# Patient Record
Sex: MALE | Race: WHITE | NOT HISPANIC OR LATINO | Employment: FULL TIME | URBAN - METROPOLITAN AREA
[De-identification: names, ages, dates, MRNs, and addresses within clinical notes are randomized per-mention and may not be internally consistent; named-entity substitution may affect disease eponyms.]

---

## 2017-04-05 ENCOUNTER — OFFICE VISIT (OUTPATIENT)
Dept: MEDICAL GROUP | Age: 42
End: 2017-04-05
Payer: COMMERCIAL

## 2017-04-05 VITALS
TEMPERATURE: 98.6 F | DIASTOLIC BLOOD PRESSURE: 84 MMHG | SYSTOLIC BLOOD PRESSURE: 142 MMHG | BODY MASS INDEX: 34.07 KG/M2 | HEART RATE: 86 BPM | OXYGEN SATURATION: 95 % | HEIGHT: 69 IN | WEIGHT: 230 LBS

## 2017-04-05 DIAGNOSIS — E66.9 OBESITY (BMI 30-39.9): ICD-10-CM

## 2017-04-05 DIAGNOSIS — Z00.00 PREVENTATIVE HEALTH CARE: ICD-10-CM

## 2017-04-05 DIAGNOSIS — R73.01 IFG (IMPAIRED FASTING GLUCOSE): ICD-10-CM

## 2017-04-05 DIAGNOSIS — I10 ESSENTIAL HYPERTENSION: ICD-10-CM

## 2017-04-05 DIAGNOSIS — K21.9 GASTROESOPHAGEAL REFLUX DISEASE WITHOUT ESOPHAGITIS: ICD-10-CM

## 2017-04-05 PROCEDURE — 99214 OFFICE O/P EST MOD 30 MIN: CPT | Performed by: FAMILY MEDICINE

## 2017-04-05 RX ORDER — OMEPRAZOLE 40 MG/1
40 CAPSULE, DELAYED RELEASE ORAL DAILY
Qty: 90 CAP | Refills: 2 | Status: SHIPPED | OUTPATIENT
Start: 2017-04-05 | End: 2018-03-13

## 2017-04-05 ASSESSMENT — PATIENT HEALTH QUESTIONNAIRE - PHQ9: CLINICAL INTERPRETATION OF PHQ2 SCORE: 0

## 2017-04-05 NOTE — ASSESSMENT & PLAN NOTE
Patient states that on his last lab report he was not fasting. He denied any polyuria, no polydipsia, no polyphagia, no weight loss, no numbness or tingling anywhere, no change in vision.      Results for HOLLEY ESTRADA II (MRN [id number]) as of 4/5/2017 15:28   Ref. Range 5/14/2015 09:34   Glucose Latest Ref Range: 65-99 mg/dL 111 (H)

## 2017-04-05 NOTE — ASSESSMENT & PLAN NOTE
The patient is a 41-year-old male  who presents to clinic to have clearance for continuing his work. He states that he is able to run several miles without any chest pain, never had a seizure he takes his amlodipine for high blood pressure and is tolerating just fine. He denies any family history of cardiovascular disease in fact both parents are alive at the age of 80 and 84.

## 2017-04-05 NOTE — PROGRESS NOTES
This medical record contains text that has been entered with the assistance of computer voice recognition and dictation software.  Therefore, it may contain unintended errors in text, spelling, punctuation, or grammar    No chief complaint on file.      Dung Estrada II is a 41 y.o. male here evaluation and management of: Medical clearance for [place], reflux      HPI:     Gastroesophageal reflux disease without esophagitis  Patient states that over the last few months he's been having on and off reflux symptoms. Severe burning in the stomach usually worsened by fatty foods or acidic food. He states that he woke up at night once because of the reflux. Patient denies any difficulty swallowing, no regurgitation, no eructation, no weight loss, no nocturnal asthma no food getting stuck in the chest.      IFG (impaired fasting glucose)  Patient states that on his last lab report he was not fasting. He denied any polyuria, no polydipsia, no polyphagia, no weight loss, no numbness or tingling anywhere, no change in vision.      Results for DUNG ESTRADA II (MRN [id number]) as of 4/5/2017 15:28   Ref. Range 5/14/2015 09:34   Glucose Latest Ref Range: 65-99 mg/dL 111 (H)       [facility] health care  The patient is a 41-year-old male  who presents to clinic to have clearance for continuing his work. He states that he is able to run several miles without any chest pain, never had a seizure he takes his amlodipine for high blood pressure and is tolerating just fine. He denies any family history of cardiovascular disease in fact both parents are alive at the age of 80 and 84.      Current medicines (including changes today)  Current Outpatient Prescriptions   Medication Sig Dispense Refill   • omeprazole (PRILOSEC) 40 MG delayed-release capsule Take 1 Cap by mouth every day. 90 Cap 2   • amlodipine (NORVASC) 10 MG Tab Take 1 Tab by mouth every day. 90 Tab 1   • valacyclovir (VALTREX) 500 MG Tab  "Take 1 Tab by mouth 2 times a day. 2 times a day for 3 days 30 Tab 3   • fluticasone (FLONASE) 50 MCG/ACT nasal spray Spray 2 Sprays in nose every day. Each Nostril (Patient not taking: Reported on 4/5/2017) 16 g 3   • fluticasone (FLONASE) 50 MCG/ACT nasal spray Spray 1 Spray in nose 2 times a day. (Patient not taking: Reported on 4/5/2017) 1 Bottle 0   • mbx (MAALOX PLUS-BENADRYL-XYLOCAINE) Take 5 mL by mouth every 6 hours as needed. (Patient not taking: Reported on 8/12/2015) 90 mL 0     No current facility-administered medications for this visit.     He  has a past medical history of HTN (hypertension) (7/14/2009); Recurrent cold sores (7/14/2009); and Rash, skin.  He  has past surgical history that includes shoulder arthroscopy (2010 and 3/13).  Social History   Substance Use Topics   • Smoking status: Never Smoker    • Smokeless tobacco: Never Used   • Alcohol Use: 0.0 oz/week     0 Standard drinks or equivalent per week      Comment: special occasions     Social History     Social History Narrative     Family History   Problem Relation Age of Onset   • Hypertension Mother    • Hypertension Father      Family Status   Relation Status Death Age   • Mother Alive    • Father Alive    • Sister Alive    • Brother Alive    • Sister Alive    • Sister Alive    • Sister Alive    • Brother Alive          ROS  Please see hpi    All other systems reviewed and are negative     Objective:     Blood pressure 142/84, pulse 86, temperature 37 °C (98.6 °F), height 1.753 m (5' 9.02\"), weight 104.327 kg (230 lb), SpO2 95 %. Body mass index is 33.95 kg/(m^2).  Physical Exam:    Constitutional: Alert, no distress.  Skin: Warm, dry, good turgor, no rashes in visible areas.  Eye: Equal, round and reactive, conjunctiva clear, lids normal.  ENMT: Lips without lesions, good dentition, oropharynx clear.  Neck: Trachea midline, no masses, no thyromegaly. No cervical or supraclavicular lymphadenopathy.  Respiratory: Unlabored respiratory " effort, lungs clear to auscultation, no wheezes, no ronchi.  Cardiovascular: Normal S1, S2, no murmur, no edema.  Abdomen: Soft, non-tender, no masses, no hepatosplenomegaly.  Psych: Alert and oriented x3, normal affect and mood.          Assessment and Plan:   The following treatment plan was discussed, again this medical record contains text that has been entered with the assistance of computer voice recognition and dictation software.  Therefore, it may contain unintended errors in text, spelling, punctuation, or grammar      1. Essential hypertension  Patient has been stable with current management  We will make no changes for now    2. Preventative health care  Due for repeat labs  He is cleared to continue working as a   Based on exam today.    - COMP METABOLIC PANEL; Future  - CBC WITH DIFFERENTIAL; Future  - LIPID PROFILE; Future    3. IFG (impaired fasting glucose)    - HEMOGLOBIN A1C; Future    4. Gastroesophageal reflux disease without esophagitis     Gerd precautions given (avoid the supine position after eating, avoid tight fitting clothing, head of bed elevation by raisin legs of bed,  avoid eating prior to sleeping, avoid reflux-inducing foods (foods high in fat, chocolate, peppermint, and excessive alcohol, which can decrease LES pressure), promote salivation by chewing gum or lozenges,  - omeprazole (PRILOSEC) 40 MG delayed-release capsule; Take 1 Cap by mouth every day.  Dispense: 90 Cap; Refill: 2        Followup: Return in about 6 months (around 10/5/2017) for Reevaluation.

## 2017-04-05 NOTE — MR AVS SNAPSHOT
"        Dung Garveynatty II   2017 3:40 PM   Office Visit   MRN: [id number]    Department:  [address] [place] [address]   Dept Phone:  [phone]    Description:  Male : 1975   Provider:  Cyn Iqbal M.D.           Allergies as of 2017     No Known Allergies      You were diagnosed with     Essential hypertension   [7295395]       Preventative health care   [403466]       IFG (impaired fasting glucose)   [768633]       Gastroesophageal reflux disease without esophagitis   [286068]       Obesity (BMI 30-39.9)   [683755]         Vital Signs     Blood Pressure Pulse Temperature Height Weight Body Mass Index    142/84 mmHg 86 37 °C (98.6 °F) 1.753 m (5' 9.02\") 104.327 kg (230 lb) 33.95 kg/m2    Oxygen Saturation Smoking Status                95% Never Smoker           Basic Information     Date Of Birth Sex Race Ethnicity Preferred Language    1975 Male White Non- English      Problem List              ICD-10-CM Priority Class Noted - Resolved    Herpes simplex type 1 infection B00.9   2009 - Present    HTN (hypertension) I10   2010 - Present    Preventative health care Z00.00   2017 - Present    IFG (impaired fasting glucose) R73.01   2017 - Present    Gastroesophageal reflux disease without esophagitis K21.9   2017 - Present    Obesity (BMI 30-39.9) E66.9   2017 - Present      Health Maintenance        Date Due Completion Dates    IMM DTaP/Tdap/Td Vaccine (2 - Td) 10/26/2019 10/26/2009, 1998            Current Immunizations     Influenza Vaccine Quad Inj (Preserved) 12/3/2014    TD Vaccine 1998    Tdap Vaccine 10/26/2009  5:45 PM      Below and/or attached are the medications your provider expects you to take. Review all of your home medications and newly ordered medications with your provider and/or pharmacist. Follow medication instructions as directed by your provider and/or pharmacist. Please keep your medication list with you and share with " your provider. Update the information when medications are discontinued, doses are changed, or new medications (including over-the-counter products) are added; and carry medication information at all times in the event of emergency situations     Allergies:  No Known Allergies          Medications  Valid as of: April 05, 2017 -  4:09 PM    Generic Name Brand Name Tablet Size Instructions for use    AmLODIPine Besylate (Tab) NORVASC 10 MG Take 1 Tab by mouth every day.        Fluticasone Propionate (Suspension) FLONASE 50 MCG/ACT Spray 1 Spray in nose 2 times a day.        Fluticasone Propionate (Suspension) FLONASE 50 MCG/ACT Spray 2 Sprays in nose every day. Each Nostril        mbx (MAALOX PLUS-BENADRYL-XYLOCAINE) (Suspension) MBX  Take 5 mL by mouth every 6 hours as needed.        Omeprazole (CAPSULE DELAYED RELEASE) PRILOSEC 40 MG Take 1 Cap by mouth every day.        ValACYclovir HCl (Tab) VALTREX 500 MG Take 1 Tab by mouth 2 times a day. 2 times a day for 3 days        .                 Medicines prescribed today were sent to:     [facility] [address] [place] [address] [address] [facility] [address]  AT [place] [address] & [place]    [address] [facility] [address] [facility] [address]    Phone: [phone] Fax: [phone]    Open 24 Hours?: No      Medication refill instructions:       If your prescription bottle indicates you have medication refills left, it is not necessary to call your provider’s office. Please contact your pharmacy and they will refill your medication.    If your prescription bottle indicates you do not have any refills left, you may request refills at any time through one of the following ways: The online [employer] system (except Urgent Care), by calling your provider’s office, or by asking your pharmacy to contact your provider’s office with a refill request. Medication refills are processed only during regular business hours and may not be available until the next business day. Your provider may  request additional information or to have a follow-up visit with you prior to refilling your medication.   *Please Note: Medication refills are assigned a new Rx number when refilled electronically. Your pharmacy may indicate that no refills were authorized even though a new prescription for the same medication is available at the pharmacy. Please request the medicine by name with the pharmacy before contacting your provider for a refill.        Your To Do List     Future Labs/Procedures Complete By Expires    CBC WITH DIFFERENTIAL  As directed 4/5/2018    COMP METABOLIC PANEL  As directed 4/5/2018    HEMOGLOBIN A1C  As directed 4/5/2018    LIPID PROFILE  As directed 4/5/2018         [place] Access Code: Activation code not generated  Current [place] Status: Active

## 2017-04-05 NOTE — ASSESSMENT & PLAN NOTE
Patient states that over the last few months he's been having on and off reflux symptoms. Severe burning in the stomach usually worsened by fatty foods or acidic food. He states that he woke up at night once because of the reflux. Patient denies any difficulty swallowing, no regurgitation, no eructation, no weight loss, no nocturnal asthma no food getting stuck in the chest.

## 2017-04-07 RX ORDER — AMLODIPINE BESYLATE 10 MG/1
TABLET ORAL
Qty: 90 TAB | Refills: 0 | Status: SHIPPED | OUTPATIENT
Start: 2017-04-07 | End: 2017-04-25 | Stop reason: SDUPTHER

## 2017-04-24 ENCOUNTER — PATIENT MESSAGE (OUTPATIENT)
Dept: MEDICAL GROUP | Age: 42
End: 2017-04-24

## 2017-04-24 DIAGNOSIS — B00.9 HERPES SIMPLEX TYPE 1 INFECTION: ICD-10-CM

## 2017-04-24 DIAGNOSIS — I10 ESSENTIAL HYPERTENSION: ICD-10-CM

## 2017-04-24 RX ORDER — AMLODIPINE BESYLATE 10 MG/1
10 TABLET ORAL
Qty: 90 TAB | Refills: 0 | Status: CANCELLED | OUTPATIENT
Start: 2017-04-24

## 2017-04-24 NOTE — TELEPHONE ENCOUNTER
Was the patient seen in the last year in this department? Yes     Does patient have an active prescription for medications requested? Yes     Received Request Via: Patient

## 2017-04-25 RX ORDER — VALACYCLOVIR HYDROCHLORIDE 500 MG/1
500 TABLET, FILM COATED ORAL 2 TIMES DAILY
Qty: 30 TAB | Refills: 0 | Status: SHIPPED | OUTPATIENT
Start: 2017-04-25 | End: 2018-03-13 | Stop reason: SDUPTHER

## 2017-04-25 RX ORDER — AMLODIPINE BESYLATE 10 MG/1
10 TABLET ORAL
Qty: 90 TAB | Refills: 1 | Status: SHIPPED | OUTPATIENT
Start: 2017-04-25 | End: 2018-03-13 | Stop reason: SDUPTHER

## 2017-04-25 NOTE — TELEPHONE ENCOUNTER
Medication resent for 90 to 1 refill.   He was last seen by me in 2015- thus would recommend schedule for fv as previously recommended by Dr. Iqbal for 6 months from his last visit. He does need blood work completed and monitoring of his BP.   Will fill Rx for acyclovir.   Please help schedule appointment as above.

## 2017-04-25 NOTE — TELEPHONE ENCOUNTER
Phone Number Called: [phone] (home)    Message: informed patient of message below.  Patient will call back to schedule appt.    Left Message for patient to call back: no

## 2018-03-13 ENCOUNTER — OFFICE VISIT (OUTPATIENT)
Dept: MEDICAL GROUP | Facility: PHYSICIAN GROUP | Age: 43
End: 2018-03-13
Payer: COMMERCIAL

## 2018-03-13 VITALS
WEIGHT: 229 LBS | DIASTOLIC BLOOD PRESSURE: 86 MMHG | SYSTOLIC BLOOD PRESSURE: 122 MMHG | RESPIRATION RATE: 16 BRPM | BODY MASS INDEX: 32.06 KG/M2 | OXYGEN SATURATION: 97 % | TEMPERATURE: 98.2 F | HEART RATE: 82 BPM | HEIGHT: 71 IN

## 2018-03-13 DIAGNOSIS — R73.01 IFG (IMPAIRED FASTING GLUCOSE): ICD-10-CM

## 2018-03-13 DIAGNOSIS — E66.9 OBESITY (BMI 30-39.9): ICD-10-CM

## 2018-03-13 DIAGNOSIS — B00.9 HERPES SIMPLEX TYPE 1 INFECTION: ICD-10-CM

## 2018-03-13 DIAGNOSIS — I10 ESSENTIAL HYPERTENSION: ICD-10-CM

## 2018-03-13 DIAGNOSIS — N52.1 ERECTILE DYSFUNCTION DUE TO DISEASES CLASSIFIED ELSEWHERE: ICD-10-CM

## 2018-03-13 DIAGNOSIS — F43.9 STRESS AT HOME: ICD-10-CM

## 2018-03-13 PROCEDURE — 99214 OFFICE O/P EST MOD 30 MIN: CPT | Performed by: FAMILY MEDICINE

## 2018-03-13 RX ORDER — AMLODIPINE BESYLATE 10 MG/1
10 TABLET ORAL
Qty: 90 TAB | Refills: 3 | Status: SHIPPED | OUTPATIENT
Start: 2018-03-13 | End: 2019-03-04 | Stop reason: SDUPTHER

## 2018-03-13 RX ORDER — VALACYCLOVIR HYDROCHLORIDE 500 MG/1
500 TABLET, FILM COATED ORAL 2 TIMES DAILY
Qty: 30 TAB | Refills: 6 | Status: SHIPPED | OUTPATIENT
Start: 2018-03-13 | End: 2022-03-15 | Stop reason: SDUPTHER

## 2018-03-13 ASSESSMENT — PATIENT HEALTH QUESTIONNAIRE - PHQ9: CLINICAL INTERPRETATION OF PHQ2 SCORE: 0

## 2018-03-13 NOTE — ASSESSMENT & PLAN NOTE
Chronic condition, well controlled. He is on amlodipine 10 mg  He has no chest pain, or swelling in legs.

## 2018-03-13 NOTE — PROGRESS NOTES
"Subjective:   Dung Dean II is a 42 y.o. male here today for evaluation and management of:     HTN (hypertension)  Chronic condition, well controlled. He is on amlodipine 10 mg  He has no chest pain, or swelling in legs.     Obesity (BMI 30-39.9)  He has been losing weight due to marital stress. He has been losing weight due to stress.    IFG (impaired fasting glucose)  He has been losing weight.   He has modified diet.   Was not fasting at last lab draw.     Stress at home  He has some ED due to marital stress   Asks about a nitric oxide supplement.          Current medicines (including changes today)  Current Outpatient Prescriptions   Medication Sig Dispense Refill   • amlodipine (NORVASC) 10 MG Tab Take 1 Tab by mouth every day. 90 Tab 1   • valacyclovir (VALTREX) 500 MG Tab Take 1 Tab by mouth 2 times a day. 2 times a day for 3 days 30 Tab 0   • omeprazole (PRILOSEC) 40 MG delayed-release capsule Take 1 Cap by mouth every day. (Patient not taking: Reported on 3/13/2018) 90 Cap 2     No current facility-administered medications for this visit.      He  has a past medical history of HTN (hypertension) (7/14/2009); Rash, skin; and Recurrent cold sores (7/14/2009).    ROS  No chest pain, no shortness of breath, no abdominal pain       Objective:     Blood pressure 122/86, pulse 82, temperature 36.8 °C (98.2 °F), resp. rate 16, height 1.803 m (5' 11\"), weight 103.9 kg (229 lb), SpO2 97 %. Body mass index is 31.94 kg/m².   Physical Exam:  Constitutional: Alert, no distress.  Skin: Warm, dry, good turgor, no rashes in visible areas.  Eye: Equal, round and reactive, conjunctiva clear, lids normal.  ENMT: Lips without lesions, good dentition, oropharynx clear.  Neck: Trachea midline, no masses, no thyromegaly. No cervical or supraclavicular lymphadenopathy  Respiratory: Unlabored respiratory effort, lungs clear to auscultation, no wheezes, no ronchi.  Cardiovascular: Normal S1, S2, no murmur, no " edema.  Abdomen: Soft, non-tender, no masses, no hepatosplenomegaly.  Psych: Alert and oriented x3, normal affect and mood.        Assessment and Plan:   The following treatment plan was discussed    1. Essential hypertension  - COMP METABOLIC PANEL; Future    2. Obesity (BMI 30-39.9)  Recheck in 3 months    3. IFG (impaired fasting glucose)  Recheck labs    4. Stress at home  Continue with counseling    5. Erectile dysfunction due to diseases classified elsewhere  - REFERRAL TO UROLOGY      Followup: No Follow-up on file.

## 2018-04-14 DIAGNOSIS — Z20.828 EXPOSURE TO INFLUENZA: ICD-10-CM

## 2018-04-14 DIAGNOSIS — R68.89 FLU-LIKE SYMPTOMS: ICD-10-CM

## 2018-04-14 RX ORDER — OSELTAMIVIR PHOSPHATE 75 MG/1
75 CAPSULE ORAL 2 TIMES DAILY
Qty: 10 CAP | Refills: 0 | Status: SHIPPED | OUTPATIENT
Start: 2018-04-14 | End: 2018-04-19

## 2019-03-05 RX ORDER — AMLODIPINE BESYLATE 10 MG/1
TABLET ORAL
Qty: 90 TAB | Refills: 0 | Status: SHIPPED | OUTPATIENT
Start: 2019-03-05 | End: 2019-04-30 | Stop reason: SDUPTHER

## 2019-03-05 NOTE — TELEPHONE ENCOUNTER
Was the patient seen in the last year in this department? No     Does patient have an active prescription for medications requested? No     Received Request Via: Pharmacy      Pt met protocol?: NO    *PT NEEDS TO MAKE APPT WITH PCP  OV 3/18     BP Readings from Last 1 Encounters:   03/13/18 122/86

## 2019-04-30 ENCOUNTER — OFFICE VISIT (OUTPATIENT)
Dept: MEDICAL GROUP | Facility: PHYSICIAN GROUP | Age: 44
End: 2019-04-30
Payer: COMMERCIAL

## 2019-04-30 VITALS
RESPIRATION RATE: 16 BRPM | TEMPERATURE: 98.7 F | WEIGHT: 237 LBS | DIASTOLIC BLOOD PRESSURE: 88 MMHG | OXYGEN SATURATION: 98 % | HEIGHT: 71 IN | HEART RATE: 90 BPM | SYSTOLIC BLOOD PRESSURE: 120 MMHG | BODY MASS INDEX: 33.18 KG/M2

## 2019-04-30 DIAGNOSIS — I10 ESSENTIAL HYPERTENSION: ICD-10-CM

## 2019-04-30 DIAGNOSIS — Z13.21 ENCOUNTER FOR VITAMIN DEFICIENCY SCREENING: ICD-10-CM

## 2019-04-30 DIAGNOSIS — Z13.29 SCREENING FOR THYROID DISORDER: ICD-10-CM

## 2019-04-30 DIAGNOSIS — F43.9 STRESS AT HOME: ICD-10-CM

## 2019-04-30 DIAGNOSIS — Z13.6 SCREENING FOR CARDIOVASCULAR CONDITION: ICD-10-CM

## 2019-04-30 PROCEDURE — 99213 OFFICE O/P EST LOW 20 MIN: CPT | Performed by: NURSE PRACTITIONER

## 2019-04-30 RX ORDER — AMLODIPINE BESYLATE 10 MG/1
10 TABLET ORAL
Qty: 90 TAB | Refills: 3 | Status: SHIPPED | OUTPATIENT
Start: 2019-04-30 | End: 2020-05-22

## 2019-04-30 ASSESSMENT — PATIENT HEALTH QUESTIONNAIRE - PHQ9: CLINICAL INTERPRETATION OF PHQ2 SCORE: 0

## 2019-04-30 ASSESSMENT — PAIN SCALES - GENERAL: PAINLEVEL: NO PAIN

## 2019-04-30 NOTE — ASSESSMENT & PLAN NOTE
Chronic health problem, well-controlled.  Takes amlodipine 10 mg daily.  Denies swelling or lightheadedness.  Blood pressures at home are 110's/70's. Blood pressure today is 152/100, retaken 10 minutes later at 120/88.  The patient denies chest pain, shortness of breath, headache, vision changes, epistaxis, or dyspnea on exertion.  Discussed lifestyle measures including routine aerobic exercise, DASH diet, weight loss.  Patient is due for labs.

## 2019-05-02 NOTE — PROGRESS NOTES
CC: Hypertension, employer paperwork    HISTORY OF THE PRESENT ILLNESS: Patient is a 43 y.o. male. This pleasant patient is here today for evaluation management of the following health problems.  He is requesting paperwork be filled out so that he can continue with his job as a  for for service.    Health Maintenance: Completed      HTN (hypertension)  Chronic health problem, well-controlled.  Takes amlodipine 10 mg daily.  Denies swelling or lightheadedness.  Blood pressures at home are 110's/70's. Blood pressure today is 152/100, retaken 10 minutes later at 120/88.  The patient denies chest pain, shortness of breath, headache, vision changes, epistaxis, or dyspnea on exertion.  Discussed lifestyle measures including routine aerobic exercise, DASH diet, weight loss.  Patient is due for labs.    Stress at home  Patient reports stress at home has greatly improved.  He and his wife are still  and happy.      Allergies: Patient has no known allergies.    Current Outpatient Prescriptions Ordered in [facility]   Medication Sig Dispense Refill   • amLODIPine (NORVASC) 10 MG Tab Take 1 Tab by mouth every day. 90 Tab 3   • valACYclovir (VALTREX) 500 MG Tab Take 1 Tab by mouth 2 times a day. 2 times a day for 3 days 30 Tab 6     No current Epic-ordered facility-administered medications on file.        Past Medical History:   Diagnosis Date   • HTN (hypertension) 7/14/2009   • Rash, skin     history of [place] often re-occurs with job.  treats with medrol dose pack    • Recurrent cold sores 7/14/2009       Past Surgical History:   Procedure Laterality Date   • SHOULDER ARTHROSCOPY  2010 and 3/13    Dr. Burrell, right       Social History   Substance Use Topics   • Smoking status: Never Smoker   • Smokeless tobacco: Never Used   • Alcohol use 0.6 oz/week     1 Cans of beer per week      Comment: special occasions       Family History   Problem Relation Age of Onset   • Hypertension Mother    •  "Hypertension Father        ROS:     - Constitutional: Negative for fever, chills, unexpected weight change, and fatigue/generalized weakness.     - HEENT: Negative for headaches, vision changes, hearing changes, ear pain, rhinorrhea, sinus congestion, and sore throat.      - Respiratory: Negative for cough, dyspnea, and wheezing.      - Cardiovascular: Negative for chest pain, palpitations, orthopnea, and bilateral lower extremity edema.     - Gastrointestinal: Negative for nausea, vomiting, abdominal pain, hematochezia, melena, diarrhea, constipation.     - Genitourinary: Negative for dysuria, polyuria.    - Musculoskeletal: Negative for myalgias.     - Skin: Negative for rash, itching, cyanotic skin color change.     - Neurological: Negative for dizziness, tingling, tremors, focal sensory deficit, focal weakness and headaches.     - Psychiatric/Behavioral: Negative for depression.             Exam: /88   Pulse 90   Temp 37.1 °C (98.7 °F) (Temporal)   Resp 16   Ht 1.803 m (5' 11\")   Wt 107.5 kg (237 lb)   SpO2 98%  Body mass index is 33.05 kg/m².    General: Alert, pleasant, well nourished, well developed male in NAD  HEENT: Normocephalic. Eyes conjunctiva clear lids without ptosis, pupils equal and reactive to light, ears normal shape and contour, canals are clear bilaterally, tympanic membranes are pearly gray with good light reflex, nasal mucosa without erythema and drainage, oropharynx is without erythema, edema or exudates.   Neck: Supple without bruit. Thyroid is not enlarged.  Pulmonary: Clear to ausculation.  Normal effort. No rales, ronchi, or wheezing.  Cardiovascular: Normal rate and rhythm without murmur. Carotid and radial pulses are intact and equal bilaterally.  No lower extremity edema.  Abdomen: Soft, nontender, nondistended. Normal bowel sounds. Liver and spleen are not palpable  Neurologic: Grossly nonfocal  Lymph: No cervical or supraclavicular lymph nodes are palpable  Skin: Warm " and dry.  No obvious lesions.  Musculoskeletal: Normal gait.   Psych: Normal mood and affect. Alert and oriented. Judgment and insight is normal.    Please note that this dictation was created using voice recognition software. I have made every reasonable attempt to correct obvious errors, but I expect that there are errors of grammar and possibly content that I did not discover before finalizing the note.      Assessment/Plan  1. Essential hypertension  Patient will continue with amlodipine 10 mg daily.  He will work on lifestyle measures.  He will get labs done.  I will notify patient of lab results via [facility].  - Comp Metabolic Panel; Future  - ESTIMATED GFR; Future  - Lipid Profile; Future  - amLODIPine (NORVASC) 10 MG Tab; Take 1 Tab by mouth every day.  Dispense: 90 Tab; Refill: 3    2. Screening for cardiovascular condition  Will notify patient of lab results via [facility].  - Comp Metabolic Panel; Future  - ESTIMATED GFR; Future  - Lipid Profile; Future    3. Screening for thyroid disorder    - TSH; Future  - FREE THYROXINE; Future    4. Encounter for vitamin deficiency screening    - VITAMIN D,25 HYDROXY; Future    5. Stress at home  Resolved.    Patient will clinic with primary care provider, Dr. Sullivan, in 6 months for hypertension or sooner if needed.  Patient would like to call for an appointment.

## 2020-06-23 ENCOUNTER — OCCUPATIONAL MEDICINE (OUTPATIENT)
Dept: URGENT CARE | Facility: PHYSICIAN GROUP | Age: 45
End: 2020-06-23
Payer: OTHER MISCELLANEOUS

## 2020-06-23 VITALS
RESPIRATION RATE: 16 BRPM | DIASTOLIC BLOOD PRESSURE: 90 MMHG | BODY MASS INDEX: 34.22 KG/M2 | SYSTOLIC BLOOD PRESSURE: 140 MMHG | TEMPERATURE: 98.6 F | OXYGEN SATURATION: 97 % | HEIGHT: 70 IN | HEART RATE: 86 BPM | WEIGHT: 239 LBS

## 2020-06-23 DIAGNOSIS — S83.91XA SPRAIN OF RIGHT KNEE, UNSPECIFIED LIGAMENT, INITIAL ENCOUNTER: ICD-10-CM

## 2020-06-23 PROCEDURE — 99214 OFFICE O/P EST MOD 30 MIN: CPT | Performed by: PHYSICIAN ASSISTANT

## 2020-06-23 ASSESSMENT — ENCOUNTER SYMPTOMS
ROS SKIN COMMENTS: NO BRUISING.
CHILLS: 0
SENSORY CHANGE: 0
TINGLING: 0
FEVER: 0
TREMORS: 0

## 2020-06-23 NOTE — LETTER
"EMPLOYEE’S CLAIM FOR COMPENSATION/ REPORT OF INITIAL TREATMENT  FORM C-4    EMPLOYEE’S CLAIM - PROVIDE ALL INFORMATION REQUESTED   First Name  Dung Last Name  Dianne Birthdate                    1975                Sex  male Claim Number   Home Address  [address] Age  44 y.o. 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 (if applicable)  Climbing dry creek bed    How did this injury or occupational disease occur? (Be specific an answer in detail. Use additional sheet if necessary)  Climbing dry creek bed, rock gave way under foot twisting knee.   If you believe that you have an occupational disease, when did you first have knowledge of the disability and it relationship to your employment?  N/A Witnesses to the Accident  N/A      Nature of Injury or Occupational Disease  Inflammation  Part(s) of Body Injured or Affected  Knee (R), N/A, N/A    I certify that the above is true and correct to the best of my knowledge and that I have provided this information in order to obtain the benefits of [place]’s " [place] and [place]).  I hereby authorize any physician, chiropractor, surgeon, practitioner, or other person, any hospital, including [facility] or [facility], any medical service organization, any insurance company, or other institution or organization to release to each other, any medical or other information, including benefits paid or payable, pertinent to this injury or disease, except information relative to diagnosis, treatment and/or counseling for AIDS, psychological conditions, alcohol or controlled substances, for which I must give specific authorization.  A Photostat of this authorization shall be as valid as the original.     Date 06/23/2020    Place [facility]   Employee’s Signature   THIS REPORT MUST BE COMPLETED AND MAILED WITHIN 3 WORKING DAYS OF TREATMENT   [facility]  Name of Facility  [place]   Date  6/23/2020 Diagnosis  (S83.91XA) Sprain of right knee, unspecified ligament, initial encounter Is there evidence the injured employee was under the influence of alcohol and/or another controlled substance at the time of accident?   Hour  4:23 PM Description of Injury or Disease  The encounter diagnosis was Sprain of right knee, unspecified ligament, initial encounter. No   Treatment  RICE TREATMENT FOR EXTREMITY INJURIES:  R-rest the extremity as much as possible while pain and swelling persist  I-ice the extremity 15 minutes every 2 hours for the first 24 hours, then 4-5 times daily   C-compress the extremity either with splint or ace wrap as directed  E-elevate the extremity to help with swelling    Continue to use ibuprofen/naproxen as needed for pain and inflammation.  May use Tylenol for any breakthrough pain.  Referral to sports medicine placed MRI imaging may be warranted.  Referral to occupational medicine.    The patient would like to go back to work full  "duty.  Currently his knee has minimal pain.  Stability is intact.  Her motion is intact.  If the knee flares up at work he states he will take a break.    Have you advised the patient to remain off work five days or more? No   X-Ray Findings      If Yes   From Date  To Date      From information given by the employee, together with medical evidence, can you directly connect this injury or occupational disease as job incurred?  Yes If No Full Duty Yes Modified Duty      Is additional medical care by a physician indicated?  Yes If Modified Duty, Specify any Limitations / Restrictions      Do you know of any previous injury or disease contributing to this condition or occupational disease?                            No   Date  6/23/2020 Print Doctor’s Name René Duenas P.A.-C. I certify the employer’s copy of  this form was mailed on:   Address  [address] Insurer’s Use Only     [place] [facility]  [address]    Provider’s Tax ID Number  [id number] Telephone  Dept: [phone]        ridge-RENÉ Barakat P.A.-C.   e-Signature: Dr. Minh George,   Medical Director Degree  MD        ORIGINAL-TREATING PHYSICIAN OR CHIROPRACTOR    PAGE 2-INSURER/TPA    PAGE 3-EMPLOYER    PAGE 4-EMPLOYEE             Form C-4 (rev.10/07)              BRIEF DESCRIPTION OF RIGHTS AND BENEFITS  (Pursuant to [place] 616C.050)    Notice of Injury or Occupational Disease (Incident Report Form C-1): If an injury or occupational disease (OD) arises out of and in the course of employment, you must provide written notice to your employer as soon as practicable, but no later than 7 days after the accident or OD. Your employer shall maintain a sufficient supply of the required forms.    Claim for Compensation (Form C-4): If medical treatment is sought, the form C-4 is available at the place of initial treatment. A completed \"Claim for Compensation\" (Form C-4) must be filed within 90 days after an accident or OD. The treating " physician or chiropractor must, within 3 working days after treatment, complete and mail to the employer, the employer's insurer and third-party , the Claim for Compensation.    Medical Treatment: If you require medical treatment for your on-the-job injury or OD, you may be required to select a physician or chiropractor from a list provided by your workers’ compensation insurer, if it has contracted with an Organization for Managed Care (MCO) or Preferred Provider Organization (PPO) or providers of health care. If your employer has not entered into a contract with an MCO or PPO, you may select a physician or chiropractor from the Panel of Physicians and Chiropractors. Any medical costs related to your industrial injury or OD will be paid by your insurer.    Temporary Total Disability (TTD): If your doctor has certified that you are unable to work for a period of at least 5 consecutive days, or 5 cumulative days in a 20-day period, or places restrictions on you that your employer does not accommodate, you may be entitled to TTD compensation.    Temporary Partial Disability (TPD): If the wage you receive upon reemployment is less than the compensation for TTD to which you are entitled, the insurer may be required to pay you TPD compensation to make up the difference. TPD can only be paid for a maximum of 24 months.    Permanent Partial Disability (PPD): When your medical condition is stable and there is an indication of a PPD as a result of your injury or OD, within 30 days, your insurer must arrange for an evaluation by a rating physician or chiropractor to determine the degree of your PPD. The amount of your PPD award depends on the date of injury, the results of the PPD evaluation and your age and wage.    Permanent Total Disability (PTD): If you are medically certified by a treating physician or chiropractor as permanently and totally disabled and have been granted a PTD status by your insurer, you  are entitled to receive monthly benefits not to exceed 66 2/3% of your average monthly wage. The amount of your PTD payments is subject to reduction if you previously received a PPD award.    Vocational Rehabilitation Services: You may be eligible for vocational rehabilitation services if you are unable to return to the job due to a permanent physical impairment or permanent restrictions as a result of your injury or occupational disease.    Transportation and Per Annie Reimbursement: You may be eligible for travel expenses and per annie associated with medical treatment.    Reopening: You may be able to reopen your claim if your condition worsens after claim closure.    Appeal Process: If you disagree with a written determination issued by the insurer or the insurer does not respond to your request, you may appeal to the Department of Administration, , by following the instructions contained in your determination letter. You must appeal the determination within 70 days from the date of the determination letter at [address], [address], [place], [place] [address], or [address] [facility] [address], [place] [address]. If you disagree with the  decision, you may appeal to the Department of Administration, . You must file your appeal within 30 days from the date of the  decision letter at [address], [address], [place], [place] [address], or [address], [place], [place] [address]. If you disagree with a decision of an , you may file a petition for judicial review with the District Court. You must do so within 30 days of the Appeal Officer’s decision. You may be represented by an  at your own expense or you may contact the [facility] for possible representation.    [place]  for Injured Workers (NAIW): If you disagree with a  decision, you may request that NAIW represent you  without charge at an  Hearing. For information regarding denial of benefits, you may contact the [facility] at: [address], [address], [place] [address], [phone], or [address], [place] [address], [phone]    To File a Complaint with the Division: If you wish to file a complaint with the  of the Division of Industrial Relations (DIR),  please contact the Workers’ Compensation Section, [address], [address], [place], [place] [address], telephone (759) [phone], or [address], [address], [place], [place] [address], telephone [phone].    For assistance with Workers’ Compensation Issues: You may contact the Office of the Governor Consumer Health Assistance, [address], [address], [place], [place] [address], Toll Free 1-931.451.2155, Web site: http://govcha.[facility].nv., E-mail julio@[email or web address].                   __________________________________________________________________                                                     ___06/23/2020___        Employee Name / Signature                                                                                                                                              Date                                                                                                                                                                                                     D-2 (rev. 06/18)                                                                                                                                                                                                                                  ?/needed assist

## 2020-06-23 NOTE — LETTER
[facility] [place]  [address] [place] [address] [place] [address]  Phone:  [phone] - Fax:  [phone]   [facility] Progress Report and Disability Certification  Date of Service: 6/23/2020   No Show:  No  Date / Time of Next Visit:     Claim Information   Patient Name: Dung Dean II  Claim Number:     Employer: [employer]  Date of Injury: 6/18/2020     Insurer / TPA: Federal Workcomp  ID / SSN:     Occupation: Police   Diagnosis: The encounter diagnosis was Sprain of right knee, unspecified ligament, initial encounter.    Medical Information   Related to Industrial Injury? Yes    Subjective Complaints:  HPI:  DOI: 6/18/2020  TANIA: The patient works for the [employer].  He was climbing a dry rock bed when a rock slipped and he twisted his right knee.  He did not fall to the ground and sustained no direct trauma to this knee.  He explains feeling an immediate sharp pain to the right knee.  He then noticed significant right knee swelling and sharp pain.  He describes a pulling sensation and a lump below his patella.  Over the last few days he states his pain is greatly decreased.  He still has some swelling.  He has full range of motion however still describes a tight sensation in the front of his knee.  He says he has full stability.  He has been using ice ibuprofen and elevation with minimal improvement.     PMH:   No pertinent past medical history to this problem  MEDS:  Medications were reviewed in EMR  ALLERGIES:  Allergies were reviewed in EMR  SOCHX:  Works for the [employer]  FH:   No pertinent family history to this problem     Objective Findings: Constitutional: Pt is oriented to person, place, and time.  Appears well-developed and well-nourished. No distress.   HENT: WNL  Mouth/Throat: Oropharynx is clear and moist.   Eyes: Conjunctivae are normal.   Cardiovascular: Normal rate.       Pulmonary/Chest: Effort normal.   Musculoskeletal: Right knee: Moderate right knee joint effusion.  No ecchymosis noted no erythema noted.  No bony tenderness to palpation over any of the bony prominences or patella.  No joint line tenderness.  Minimal tenderness to palpation over the patellar tendon.  Negative anterior and posterior drawer test.  Negative varus valgus stress test.  Negative Madonna's test.  Patient demonstrates full knee flexion and extension.  He is able to weight-bear without a limp.  Sensation is full and intact distally.  Neurological: Pt is alert and oriented to person, place, and time. Coordination normal.   Skin: Skin is warm. Pt is not diaphoretic. No erythema.   Psychiatric: Pt has a normal mood and affect.  Behavior is normal.      Pre-Existing Condition(s):     Assessment:   Initial Visit    Status: Additional Care Required  Permanent Disability:No    Plan: Transfer Care  Comments:Transfer of care to sports medicine and occupational health.    Diagnostics:      Comments:  RICE TREATMENT FOR EXTREMITY INJURIES:  R-rest the extremity as much as possible while pain and swelling persist  I-ice the extremity 15 minutes every 2 hours for the first 24 hours, then 4-5 times daily   C-compress the extremity either with splint   or ace wrap as directed  E-elevate the extremity to help with swelling    May use ibuprofen/naproxen as needed for pain and inflammation.  Follow-up with sports medicine for possible MRI.  Follow up in 1 week.    Disability Information   Status: Released to Full Duty  Comments:Patient's exam findings are non-concerning at this time.  The patient would like to go back to full duty.  He states if he notices any knee pain he can talk to his boss and slow down.    From:     Through:   Restrictions are:     Physical Restrictions   Sitting:    Standing:    Stooping:    Bending:      Squatting:    Walking:    Climbing:    Pushing:      Pulling:    Other:    Reaching Above  Shoulder (L):   Reaching Above Shoulder (R):       Reaching Below Shoulder (L):    Reaching Below Shoulder (R):      Not to exceed Weight Limits   Carrying(hrs):   Weight Limit(lb):   Lifting(hrs):   Weight  Limit(lb):     Comments: Full duty.  Follow-up with sports medicine in 1 week.    Repetitive Actions   Hands: i.e. Fine Manipulations from Grasping:     Feet: i.e. Operating Foot Controls:     Driving / Operate Machinery:     Physician Name: René Duenas P.A.-C. Physician Signature: RENÉ Ramsey P.A.-C. e-Signature: Dr. Minh George, Medical Director   Clinic Name / Location: [address] [address] Clinic Phone Number: Dept: [phone]   Appointment Time: 3:55 Pm Visit Start Time: 4:23 PM   Check-In Time:  3:59 Pm Visit Discharge Time: 5: 17 PM   Original-Treating Physician or Chiropractor    Page 2-Insurer/TPA    Page 3-Employer    Page 4-Employee

## 2020-06-24 NOTE — PROGRESS NOTES
"Subjective:      Dung Dean II is a 44 y.o. male who presents with Knee Injury ([employer]/ climbing a dry rock bed twisted his leg. His right knee swelled an was painful.  Has a pulling sensation in the front of his knee.)      HPI:  DOI: 6/18/2020  TANIA: The patient works for the [employer].  He was climbing a dry rock bed when a rock slipped and he twisted his right knee.  He did not fall to the ground and sustained no direct trauma to this knee.  He explains feeling an immediate sharp pain to the right knee.  He then noticed significant right knee swelling and sharp pain.  He describes a pulling sensation and a lump below his patella.  Over the last few days he states his pain is greatly decreased.  He still has some swelling.  He has full range of motion however still describes a tight sensation in the front of his knee.  He says he has full stability.  He has been using ice ibuprofen and elevation with minimal improvement.     PMH:   No pertinent past medical history to this problem  MEDS:  Medications were reviewed in EMR  ALLERGIES:  Allergies were reviewed in EMR  SOCHX:  Works for the [employer]  FH:   No pertinent family history to this problem           Review of Systems   Constitutional: Negative for chills and fever.   Musculoskeletal:        See HPI.   Skin: Negative for rash.        No bruising.   Neurological: Negative for tingling, tremors and sensory change.          Objective:     /90   Pulse 86   Temp 37 °C (98.6 °F) (Temporal)   Resp 16   Ht 1.778 m (5' 10\")   Wt 108.4 kg (239 lb)   SpO2 97%   BMI 34.29 kg/m²      Physical Exam    Constitutional: Pt is oriented to person, place, and time.  Appears well-developed and well-nourished. No distress.   HENT: WNL  Mouth/Throat: Oropharynx is clear and moist.   Eyes: Conjunctivae are normal.   Cardiovascular: Normal rate.    Pulmonary/Chest: " Effort normal.   Musculoskeletal: Right knee: Moderate right knee joint effusion.  No ecchymosis noted no erythema noted.  No bony tenderness to palpation over any of the bony prominences or patella.  No joint line tenderness.  Minimal tenderness to palpation over the patellar tendon.  Negative anterior and posterior drawer test.  Negative varus valgus stress test.  Negative Madonna's test.  Patient demonstrates full knee flexion and extension.  He is able to weight-bear without a limp.  Sensation is full and intact distally.  Neurological: Pt is alert and oriented to person, place, and time. Coordination normal.   Skin: Skin is warm. Pt is not diaphoretic. No erythema.   Psychiatric: Pt has a normal mood and affect.  Behavior is normal.          Assessment/Plan:       1. Sprain of right knee, unspecified ligament, initial encounter    - REFERRAL TO SPORTS MEDICINE  - REFERRAL TO OCCUPATIONAL MEDICINE    RICE TREATMENT FOR EXTREMITY INJURIES:  R-rest the extremity as much as possible while pain and swelling persist  I-ice the extremity 15 minutes every 2 hours for the first 24 hours, then 4-5 times daily   C-compress the extremity either with splint or ace wrap as directed  E-elevate the extremity to help with swelling    May use ibuprofen/naproxen twice daily for inflammation and pain.  May use Tylenol for any breakthrough pain.    The patient requested to go back to full duty at this point.  He states if symptoms flareup he will take a break.  He feels stable and feels that he is able to do his job without compromise.  Follow-up with sports medicine in 1 week.

## 2020-06-25 ENCOUNTER — OCCUPATIONAL MEDICINE (OUTPATIENT)
Dept: OCCUPATIONAL MEDICINE | Facility: CLINIC | Age: 45
End: 2020-06-25
Payer: OTHER MISCELLANEOUS

## 2020-06-25 VITALS
TEMPERATURE: 98.8 F | HEIGHT: 70 IN | HEART RATE: 83 BPM | SYSTOLIC BLOOD PRESSURE: 124 MMHG | WEIGHT: 233 LBS | RESPIRATION RATE: 14 BRPM | OXYGEN SATURATION: 98 % | DIASTOLIC BLOOD PRESSURE: 86 MMHG | BODY MASS INDEX: 33.36 KG/M2

## 2020-06-25 DIAGNOSIS — S83.91XD SPRAIN OF RIGHT KNEE, UNSPECIFIED LIGAMENT, SUBSEQUENT ENCOUNTER: ICD-10-CM

## 2020-06-25 PROCEDURE — 99203 OFFICE O/P NEW LOW 30 MIN: CPT | Performed by: PREVENTIVE MEDICINE

## 2020-06-25 NOTE — LETTER
"[address],   [address] [place] [address]  Phone:  [phone] - Fax:  [phone]   [facility] Progress Report and Disability Certification  Date of Service: 6/25/2020   No Show:  No  Date / Time of Next Visit: 7/9/2020 @ 3pm   Claim Information   Patient Name: Dung Dean II  Claim Number:     Employer: [employer]  Date of Injury: 6/18/2020     Insurer / TPA: [employer]  ID / SSN:     Occupation: Police   Diagnosis: The encounter diagnosis was Sprain of right knee, unspecified ligament, subsequent encounter.    Medical Information   Related to Industrial Injury? Yes    Subjective Complaints:  DOI 6/18/2020: 46 yo male presents with right knee injury.  He was climbing a [place] bed when a rock gave way and he twisted his right knee.  He noticed some swelling and pain the first day.  Symptoms did not improve after few days and presented to the urgent care.  Advised NSAIDs.  Patient states overall symptoms about the same.  Symptoms and tend to be anterior and on both medial lateral aspects.  He states he still has a bit of swelling on the anterior aspect of the knee.  He notes that his knee gets \"tight\".  He denies any instability of the knee.  Able to squat has full range of motion.  Denies prior right knee injuries.  Taking naproxen for relief.   Objective Findings: Right knee: Minimal anterior effusion.  Minimal diffuse tenderness anterior, medial and lateral knee diffusely.  Full range of motion.  Anterior/posterior drawer test negative.  No laxity of varus or valgus stress.  Madonna's negative.  Able to squat with minimal difficulty.   Pre-Existing Condition(s):     Assessment:   Condition Same    Status: Additional Care Required  Permanent Disability:No    Plan:      Diagnostics:      Comments:  Continue OTC naproxen  No need for knee brace at this time  Full duty, activities as tolerated  Follow-up 2 weeks, if " no improvement of symptoms will consider further imaging with MRI    Disability Information   Status: Released to Full Duty    From:  6/25/2020  Through: 7/9/2020 Restrictions are: Temporary   Physical Restrictions   Sitting:    Standing:    Stooping:    Bending:      Squatting:    Walking:    Climbing:    Pushing:      Pulling:    Other:    Reaching Above Shoulder (L):   Reaching Above Shoulder (R):       Reaching Below Shoulder (L):    Reaching Below Shoulder (R):      Not to exceed Weight Limits   Carrying(hrs):   Weight Limit(lb):   Lifting(hrs):   Weight  Limit(lb):     Comments:      Repetitive Actions   Hands: i.e. Fine Manipulations from Grasping:     Feet: i.e. Operating Foot Controls:     Driving / Operate Machinery:     Provider Name:   Derek Mahan D.O. Physician Signature:  Physician Name:     Clinic Name / Location: [address],   [address] [place] [address] Clinic Phone Number: Dept: [phone]   Appointment Time: 4:30 Pm Visit Start Time: 4:00 PM   Check-In Time:  4:00 Pm Visit Discharge Time:  4:30pm   Original-Treating Physician or Chiropractor    Page 2-Insurer/TPA    Page 3-Employer    Page 4-Employee

## 2020-06-25 NOTE — PROGRESS NOTES
"Subjective:     Dung Dean II is a 45 y.o. male who presents for Follow-Up (DOI: 6/18/2020 Rt Knee - Same - RM 17)      DOI 6/18/2020: 46 yo male presents with right knee injury.  He was climbing a [place] bed when a rock gave way and he twisted his right knee.  He noticed some swelling and pain the first day.  Symptoms did not improve after few days and presented to the urgent care.  Advised NSAIDs.  Patient states overall symptoms about the same.  Symptoms and tend to be anterior and on both medial lateral aspects.  He states he still has a bit of swelling on the anterior aspect of the knee.  He notes that his knee gets \"tight\".  He denies any instability of the knee.  Able to squat has full range of motion.  Denies prior right knee injuries.  Taking naproxen for relief.    ROS: All systems were reviewed on intake form, form was reviewed and signed. See scanned documents in media. Pertinent positives and negatives included in HPI.    PMH: No pertinent past medical history to this problem  MEDS: Medications were reviewed in Epic  ALLERGIES: No Known Allergies  SOCHX: Works as  at [employer]  FH: No pertinent family history to this problem       Objective:     /86   Pulse 83   Temp 37.1 °C (98.8 °F) (Temporal)   Resp 14   Ht 1.778 m (5' 10\")   Wt 105.7 kg (233 lb)   SpO2 98%   BMI 33.43 kg/m²     Constitutional: Patient is in no acute distress. Appears well-developed and well-nourished.   HENT: Normocephalic and atraumatic. EOM are normal. No scleral icterus.   Cardiovascular: Normal rate.    Pulmonary/Chest: Effort normal. No respiratory distress.   Neurological: Patient is alert and oriented to person, place, and time.   Skin: Skin is warm and dry.   Psychiatric: Normal mood and affect. Behavior is normal.     Right knee: Minimal anterior effusion.  Minimal diffuse tenderness anterior, medial and lateral knee diffusely.  Full range of motion.  Anterior/posterior drawer " test negative.  No laxity of varus or valgus stress.  Madonna's negative.  Able to squat with minimal difficulty.    Assessment/Plan:       1. Sprain of right knee, unspecified ligament, subsequent encounter    • Continue OTC naproxen  • No need for knee brace at this time  • Full duty, activities as tolerated  • Follow-up 2 weeks, if no improvement of symptoms will consider further imaging with MRI    Differential diagnosis, natural history, supportive care, and indications for immediate follow-up discussed.

## 2020-07-09 ENCOUNTER — OCCUPATIONAL MEDICINE (OUTPATIENT)
Dept: OCCUPATIONAL MEDICINE | Facility: CLINIC | Age: 45
End: 2020-07-09
Payer: OTHER MISCELLANEOUS

## 2020-07-09 VITALS
BODY MASS INDEX: 33.36 KG/M2 | HEART RATE: 83 BPM | OXYGEN SATURATION: 97 % | WEIGHT: 233 LBS | TEMPERATURE: 97.7 F | HEIGHT: 70 IN | SYSTOLIC BLOOD PRESSURE: 140 MMHG | DIASTOLIC BLOOD PRESSURE: 92 MMHG

## 2020-07-09 DIAGNOSIS — S83.91XD SPRAIN OF RIGHT KNEE, UNSPECIFIED LIGAMENT, SUBSEQUENT ENCOUNTER: ICD-10-CM

## 2020-07-09 PROCEDURE — 99213 OFFICE O/P EST LOW 20 MIN: CPT | Performed by: PREVENTIVE MEDICINE

## 2020-07-09 ASSESSMENT — ENCOUNTER SYMPTOMS
SENSORY CHANGE: 0
TINGLING: 0

## 2020-07-09 NOTE — LETTER
[address],   [address] [place] [address]  Phone:  [phone] - Fax:  [phone]   [facility] Progress Report and Disability Certification  Date of Service: 7/9/2020   No Show:  No  Date / Time of Next Visit: 7/31/2020 @ 2:30 PM   Claim Information   Patient Name: Dung Dean II  Claim Number:     Employer: [employer]  Date of Injury: 6/18/2020     Insurer / TPA: [facility] [employer]  ID / SSN:     Occupation: Police   Diagnosis: The encounter diagnosis was Sprain of right knee, unspecified ligament, subsequent encounter.    Medical Information   Related to Industrial Injury? Yes    Subjective Complaints:  DOI 6/18/2020: 46 yo male presents with right knee injury.  He was climbing a [place] bed when a rock gave way and he twisted his right knee.  He noticed some swelling and pain the first day.  Patient states that overall symptoms are the same.  He continues to have pain on the lateral side of the knee.  Pain is worse with going on uneven terrain, kneeling or with certain flexion positions of the knee.  He has been icing, stretching without any relief.   Objective Findings: Right knee: No gross deformity.  Tenderness over the lateral knee.  Full range of motion.  Anterior/posterior drawer test negative.     Pre-Existing Condition(s):     Assessment:   Condition Same    Status: Additional Care Required  Permanent Disability:No    Plan:      Diagnostics:      Comments:  Some concern for possible meniscus injury, referral for MRI right knee  OTC ibuprofen/Tylenol as needed  Continue icing and stretching  Full duty activities as tolerated  Follow-up 3 weeks, sooner if MRI performed sooner    Disability Information   Status: Released to Full Duty    From:  7/9/2020  Through: 7/31/2020 Restrictions are: Temporary   Physical Restrictions   Sitting:    Standing:    Stooping:    Bending:      Squatting:    Walking:    Climbing:   Pushing:      Pulling:    Other:    Reaching Above Shoulder (L):   Reaching Above Shoulder (R):       Reaching Below Shoulder (L):    Reaching Below Shoulder (R):      Not to exceed Weight Limits   Carrying(hrs):   Weight Limit(lb):   Lifting(hrs):   Weight  Limit(lb):     Comments:      Repetitive Actions   Hands: i.e. Fine Manipulations from Grasping:     Feet: i.e. Operating Foot Controls:     Driving / Operate Machinery:     Provider Name:   Derek Mahan D.O. Physician Signature:  Physician Name:     Clinic Name / Location: [address],   [address] [place] [address] Clinic Phone Number: Dept: [phone]   Appointment Time: 3:00 Pm Visit Start Time: 3:01 PM   Check-In Time:  3:00 Pm Visit Discharge Time:  3:20 PM   Original-Treating Physician or Chiropractor    Page 2-Insurer/TPA    Page 3-Employer    Page 4-Employee

## 2020-07-09 NOTE — PROGRESS NOTES
"Subjective:     Dung Dean II is a 45 y.o. male who presents for Other (DOI: 6/18/2020 Rt Knee - Same - RM 17)      DOI 6/18/2020: 46 yo male presents with right knee injury.  He was climbing a [place] bed when a rock gave way and he twisted his right knee.  He noticed some swelling and pain the first day.  Patient states that overall symptoms are the same.  He continues to have pain on the lateral side of the knee.  Pain is worse with going on uneven terrain, kneeling or with certain flexion positions of the knee.  He has been icing, stretching without any relief.    Review of Systems   Neurological: Negative for tingling and sensory change.       SOCHX: Works as a  at [employer]  FH: No pertinent family history to this problem.       Objective:     /92   Pulse 83   Temp 36.5 °C (97.7 °F)   Ht 1.778 m (5' 10\")   Wt 105.7 kg (233 lb)   SpO2 97%   BMI 33.43 kg/m²     Constitutional: Patient is in no acute distress. Appears well-developed and well-nourished.   Cardiovascular: Normal rate.    Pulmonary/Chest: Effort normal. No respiratory distress.   Neurological: Patient is alert and oriented to person, place, and time.   Skin: Skin is warm and dry.   Psychiatric: Normal mood and affect. Behavior is normal.     Right knee: No gross deformity.  Tenderness over the lateral knee.  Full range of motion.  Anterior/posterior drawer test negative.      Assessment/Plan:       1. Sprain of right knee, unspecified ligament, subsequent encounter  - REFERRAL TO RADIOLOGY  - MR-KNEE-W/O RIGHT; Future    • Some concern for possible meniscus injury, referral for MRI right knee  • OTC ibuprofen/Tylenol as needed  • Continue icing and stretching  • Full duty activities as tolerated  • Follow-up 3 weeks, sooner if MRI performed sooner    Differential diagnosis, natural history, supportive care, and indications for immediate follow-up discussed.  "

## 2020-07-29 ENCOUNTER — HOSPITAL ENCOUNTER (OUTPATIENT)
Dept: RADIOLOGY | Facility: MEDICAL CENTER | Age: 45
End: 2020-07-29
Attending: PREVENTIVE MEDICINE
Payer: OTHER MISCELLANEOUS

## 2020-07-29 DIAGNOSIS — S83.91XD SPRAIN OF RIGHT KNEE, UNSPECIFIED LIGAMENT, SUBSEQUENT ENCOUNTER: ICD-10-CM

## 2020-07-29 PROCEDURE — 73721 MRI JNT OF LWR EXTRE W/O DYE: CPT | Mod: RT

## 2020-07-31 ENCOUNTER — OCCUPATIONAL MEDICINE (OUTPATIENT)
Dept: OCCUPATIONAL MEDICINE | Facility: CLINIC | Age: 45
End: 2020-07-31
Payer: OTHER MISCELLANEOUS

## 2020-07-31 VITALS
DIASTOLIC BLOOD PRESSURE: 84 MMHG | WEIGHT: 233 LBS | HEART RATE: 83 BPM | OXYGEN SATURATION: 96 % | SYSTOLIC BLOOD PRESSURE: 128 MMHG | BODY MASS INDEX: 33.36 KG/M2 | TEMPERATURE: 98.3 F | HEIGHT: 70 IN

## 2020-07-31 DIAGNOSIS — S83.241D ACUTE MEDIAL MENISCUS TEAR, RIGHT, SUBSEQUENT ENCOUNTER: ICD-10-CM

## 2020-07-31 DIAGNOSIS — S83.91XD SPRAIN OF RIGHT KNEE, UNSPECIFIED LIGAMENT, SUBSEQUENT ENCOUNTER: ICD-10-CM

## 2020-07-31 PROCEDURE — 99213 OFFICE O/P EST LOW 20 MIN: CPT | Performed by: PREVENTIVE MEDICINE

## 2020-07-31 ASSESSMENT — ENCOUNTER SYMPTOMS
TINGLING: 0
SENSORY CHANGE: 0

## 2020-07-31 NOTE — PROGRESS NOTES
"Subjective:     Dung Dean II is a 45 y.o. male who presents for Other (WC DOI 6/18/2020 Rt Knee - Same - RM 17)      DOI 6/18/2020: 46 yo male presents with right knee injury.  He was climbing a [place] bed when a rock gave way and he twisted his right knee.  Patient states that right knee pain is about the same.  He does continue to have some instability occasional pain in the anteromedial aspect.  MRI performed a few days ago.  Review of Systems   Neurological: Negative for tingling and sensory change.         MEDS: Medications were reviewed in Epic  ALLERGIES: No Known Allergies  SOCHX: Works as a   FH: No pertinent family history to this problem       Objective:     /84   Pulse 83   Temp 36.8 °C (98.3 °F)   Ht 1.778 m (5' 10\")   Wt 105.7 kg (233 lb)   SpO2 96%   BMI 33.43 kg/m²     Constitutional: Patient is in no acute distress. Appears well-developed and well-nourished.   HENT: Normocephalic and atraumatic. EOM are normal. No scleral icterus.   Cardiovascular: Normal rate.    Pulmonary/Chest: Effort normal. No respiratory distress.   Neurological: Patient is alert and oriented to person, place, and time.   Skin: Skin is warm and dry.   Psychiatric: Normal mood and affect. Behavior is normal.     Right knee: No gross deformity.  Full range of motion.  Normal gait    MRI Right Knee: Horizontal oblique tear involving the body and posterior horn of the medial meniscus. Small radial tear in the posterior horn of the medial meniscus as well.    Assessment/Plan:       1. Sprain of right knee, unspecified ligament, subsequent encounter  - REFERRAL TO ORTHOPEDICS    2. Acute medial meniscus tear, right, subsequent encounter  - REFERRAL TO ORTHOPEDICS    • Given MRI findings referral to Orthopedics  • OTC ibuprofen/Tylenol as needed  • Continue icing and stretching  • Full duty activities as tolerated  • Follow-up as needed    Differential diagnosis, natural history, supportive care, and " indications for immediate follow-up discussed.

## 2020-07-31 NOTE — LETTER
[address],   [address] [place] [address]  Phone:  [phone] - Fax:  [phone]   [facility] Progress Report and Disability Certification  Date of Service: 7/31/2020   No Show:  No  Date / Time of Next Visit:  [facility]   Claim Information   Patient Name: Dung Dean II  Claim Number:     Employer: [employer]  Date of Injury: 6/18/2020     Insurer / TPA: [employer]  ID / SSN:     Occupation: Police   Diagnosis: Diagnoses of Sprain of right knee, unspecified ligament, subsequent encounter and Acute medial meniscus tear, right, subsequent encounter were pertinent to this visit.    Medical Information   Related to Industrial Injury? Yes    Subjective Complaints:  DOI 6/18/2020: 44 yo male presents with right knee injury.  He was climbing a [place] bed when a rock gave way and he twisted his right knee.  Patient states that right knee pain is about the same.  He does continue to have some instability occasional pain in the anteromedial aspect.  MRI performed a few days ago.   Objective Findings: Right knee: No gross deformity.  Full range of motion.  Normal gait    MRI Right Knee: Horizontal oblique tear involving the body and posterior horn of the medial meniscus. Small radial tear in the posterior horn of the medial meniscus as well.   Pre-Existing Condition(s):     Assessment:   Condition Same    Status: Additional Care Required  Permanent Disability:No    Plan:      Diagnostics:      Comments:  Given MRI findings referral to Orthopedics  OTC ibuprofen/Tylenol as needed  Continue icing and stretching  Full duty activities as tolerated  Follow-up as needed    Disability Information   Status: Released to Full Duty    From:  7/31/2020  Through:   Restrictions are:     Physical Restrictions   Sitting:    Standing:    Stooping:    Bending:      Squatting:    Walking:    Climbing:    Pushing:      Pulling:     Other:    Reaching Above Shoulder (L):   Reaching Above Shoulder (R):       Reaching Below Shoulder (L):    Reaching Below Shoulder (R):      Not to exceed Weight Limits   Carrying(hrs):   Weight Limit(lb):   Lifting(hrs):   Weight  Limit(lb):     Comments:      Repetitive Actions   Hands: i.e. Fine Manipulations from Grasping:     Feet: i.e. Operating Foot Controls:     Driving / Operate Machinery:     Provider Name:   Derek Mahan D.O. Physician Signature:  Physician Name:     Clinic Name / Location: [address],   [address] [address] Clinic Phone Number: Dept: [phone]   Appointment Time: 2:30 Pm Visit Start Time: 2:14 PM   Check-In Time:  2:09 Pm Visit Discharge Time: 2:50 PM    Original-Treating Physician or Chiropractor    Page 2-Insurer/TPA    Page 3-Employer    Page 4-Employee

## 2020-12-04 DIAGNOSIS — I10 ESSENTIAL HYPERTENSION: ICD-10-CM

## 2020-12-04 RX ORDER — AMLODIPINE BESYLATE 10 MG/1
TABLET ORAL
Qty: 90 TAB | Refills: 3 | Status: SHIPPED | OUTPATIENT
Start: 2020-12-04 | End: 2022-03-15 | Stop reason: SDUPTHER

## 2022-03-15 DIAGNOSIS — B00.9 HERPES SIMPLEX TYPE 1 INFECTION: ICD-10-CM

## 2022-03-15 DIAGNOSIS — I10 ESSENTIAL HYPERTENSION: ICD-10-CM

## 2022-03-15 NOTE — TELEPHONE ENCOUNTER
Received request via: Patient    Was the patient seen in the last year in this department? No  Last OV 4/30/19  Next OV 4/5/22    Does the patient have an active prescription (recently filled or refills available) for medication(s) requested? No     Requested Prescriptions     Pending Prescriptions Disp Refills   • valACYclovir (VALTREX) 500 MG Tab 30 Tablet 0     Sig: Take 1 Tablet by mouth 2 times a day. 2 times a day for 3 days   • amLODIPine (NORVASC) 10 MG Tab 30 Tablet 0     Sig: Take 1 Tablet by mouth every day.

## 2022-03-16 RX ORDER — VALACYCLOVIR HYDROCHLORIDE 500 MG/1
500 TABLET, FILM COATED ORAL 2 TIMES DAILY
Qty: 30 TABLET | Refills: 0 | Status: SHIPPED | OUTPATIENT
Start: 2022-03-16

## 2022-03-16 RX ORDER — AMLODIPINE BESYLATE 10 MG/1
10 TABLET ORAL
Qty: 30 TABLET | Refills: 0 | Status: SHIPPED | OUTPATIENT
Start: 2022-03-16

## 2022-09-26 ENCOUNTER — HOSPITAL ENCOUNTER (OUTPATIENT)
Dept: LAB | Facility: MEDICAL CENTER | Age: 47
End: 2022-09-26
Attending: NURSE PRACTITIONER
Payer: COMMERCIAL

## 2022-09-26 LAB
ALBUMIN SERPL BCP-MCNC: 4.7 G/DL (ref 3.2–4.9)
ALBUMIN/GLOB SERPL: 1.7 G/DL
ALP SERPL-CCNC: 61 U/L (ref 30–99)
ALT SERPL-CCNC: 29 U/L (ref 2–50)
ANION GAP SERPL CALC-SCNC: 9 MMOL/L (ref 7–16)
AST SERPL-CCNC: 27 U/L (ref 12–45)
BASOPHILS # BLD AUTO: 0.7 % (ref 0–1.8)
BASOPHILS # BLD: 0.04 K/UL (ref 0–0.12)
BILIRUB SERPL-MCNC: 1.4 MG/DL (ref 0.1–1.5)
BUN SERPL-MCNC: 19 MG/DL (ref 8–22)
CALCIUM SERPL-MCNC: 9.6 MG/DL (ref 8.5–10.5)
CHLORIDE SERPL-SCNC: 102 MMOL/L (ref 96–112)
CHOLEST SERPL-MCNC: 202 MG/DL (ref 100–199)
CO2 SERPL-SCNC: 28 MMOL/L (ref 20–33)
CREAT SERPL-MCNC: 1.05 MG/DL (ref 0.5–1.4)
EOSINOPHIL # BLD AUTO: 0.2 K/UL (ref 0–0.51)
EOSINOPHIL NFR BLD: 3.5 % (ref 0–6.9)
ERYTHROCYTE [DISTWIDTH] IN BLOOD BY AUTOMATED COUNT: 41.7 FL (ref 35.9–50)
FASTING STATUS PATIENT QL REPORTED: NORMAL
GFR SERPLBLD CREATININE-BSD FMLA CKD-EPI: 87 ML/MIN/1.73 M 2
GLOBULIN SER CALC-MCNC: 2.8 G/DL (ref 1.9–3.5)
GLUCOSE SERPL-MCNC: 111 MG/DL (ref 65–99)
HCT VFR BLD AUTO: 47.3 % (ref 42–52)
HDLC SERPL-MCNC: 53 MG/DL
HGB BLD-MCNC: 16.4 G/DL (ref 14–18)
IMM GRANULOCYTES # BLD AUTO: 0.03 K/UL (ref 0–0.11)
IMM GRANULOCYTES NFR BLD AUTO: 0.5 % (ref 0–0.9)
LDLC SERPL CALC-MCNC: 135 MG/DL
LYMPHOCYTES # BLD AUTO: 1.71 K/UL (ref 1–4.8)
LYMPHOCYTES NFR BLD: 30.1 % (ref 22–41)
MCH RBC QN AUTO: 31.3 PG (ref 27–33)
MCHC RBC AUTO-ENTMCNC: 34.7 G/DL (ref 33.7–35.3)
MCV RBC AUTO: 90.3 FL (ref 81.4–97.8)
MONOCYTES # BLD AUTO: 0.49 K/UL (ref 0–0.85)
MONOCYTES NFR BLD AUTO: 8.6 % (ref 0–13.4)
NEUTROPHILS # BLD AUTO: 3.22 K/UL (ref 1.82–7.42)
NEUTROPHILS NFR BLD: 56.6 % (ref 44–72)
NRBC # BLD AUTO: 0 K/UL
NRBC BLD-RTO: 0 /100 WBC
PLATELET # BLD AUTO: 217 K/UL (ref 164–446)
PMV BLD AUTO: 10.7 FL (ref 9–12.9)
POTASSIUM SERPL-SCNC: 4.4 MMOL/L (ref 3.6–5.5)
PROT SERPL-MCNC: 7.5 G/DL (ref 6–8.2)
PSA SERPL-MCNC: 0.63 NG/ML (ref 0–4)
RBC # BLD AUTO: 5.24 M/UL (ref 4.7–6.1)
SODIUM SERPL-SCNC: 139 MMOL/L (ref 135–145)
T4 FREE SERPL-MCNC: 1.18 NG/DL (ref 0.93–1.7)
TRIGL SERPL-MCNC: 71 MG/DL (ref 0–149)
TSH SERPL DL<=0.005 MIU/L-ACNC: 2.1 UIU/ML (ref 0.38–5.33)
WBC # BLD AUTO: 5.7 K/UL (ref 4.8–10.8)

## 2022-09-26 PROCEDURE — 84153 ASSAY OF PSA TOTAL: CPT

## 2022-09-26 PROCEDURE — 80053 COMPREHEN METABOLIC PANEL: CPT

## 2022-09-26 PROCEDURE — 80061 LIPID PANEL: CPT

## 2022-09-26 PROCEDURE — 84439 ASSAY OF FREE THYROXINE: CPT

## 2022-09-26 PROCEDURE — 85025 COMPLETE CBC W/AUTO DIFF WBC: CPT

## 2022-09-26 PROCEDURE — 84443 ASSAY THYROID STIM HORMONE: CPT

## 2022-09-26 PROCEDURE — 36415 COLL VENOUS BLD VENIPUNCTURE: CPT

## 2022-11-08 ENCOUNTER — HOSPITAL ENCOUNTER (OUTPATIENT)
Dept: RADIOLOGY | Facility: MEDICAL CENTER | Age: 47
End: 2022-11-08
Attending: NURSE PRACTITIONER
Payer: COMMERCIAL

## 2022-11-08 DIAGNOSIS — R10.11 RIGHT UPPER QUADRANT PAIN: ICD-10-CM

## 2022-11-08 DIAGNOSIS — R22.1 MASS OF NECK: ICD-10-CM

## 2022-11-08 PROCEDURE — 76705 ECHO EXAM OF ABDOMEN: CPT

## 2022-11-08 PROCEDURE — 76536 US EXAM OF HEAD AND NECK: CPT

## 2023-03-28 ENCOUNTER — HOSPITAL ENCOUNTER (OUTPATIENT)
Dept: LAB | Facility: MEDICAL CENTER | Age: 48
End: 2023-03-28
Attending: NURSE PRACTITIONER
Payer: COMMERCIAL

## 2023-03-28 LAB
ALBUMIN SERPL BCP-MCNC: 4.6 G/DL (ref 3.2–4.9)
ALBUMIN/GLOB SERPL: 1.9 G/DL
ALP SERPL-CCNC: 53 U/L (ref 30–99)
ALT SERPL-CCNC: 30 U/L (ref 2–50)
ANION GAP SERPL CALC-SCNC: 12 MMOL/L (ref 7–16)
AST SERPL-CCNC: 22 U/L (ref 12–45)
BASOPHILS # BLD AUTO: 0.5 % (ref 0–1.8)
BASOPHILS # BLD: 0.03 K/UL (ref 0–0.12)
BILIRUB SERPL-MCNC: 1.1 MG/DL (ref 0.1–1.5)
BUN SERPL-MCNC: 18 MG/DL (ref 8–22)
CALCIUM ALBUM COR SERPL-MCNC: 8.8 MG/DL (ref 8.5–10.5)
CALCIUM SERPL-MCNC: 9.3 MG/DL (ref 8.5–10.5)
CHLORIDE SERPL-SCNC: 103 MMOL/L (ref 96–112)
CHOLEST SERPL-MCNC: 190 MG/DL (ref 100–199)
CO2 SERPL-SCNC: 25 MMOL/L (ref 20–33)
CREAT SERPL-MCNC: 0.78 MG/DL (ref 0.5–1.4)
EOSINOPHIL # BLD AUTO: 0.14 K/UL (ref 0–0.51)
EOSINOPHIL NFR BLD: 2.2 % (ref 0–6.9)
ERYTHROCYTE [DISTWIDTH] IN BLOOD BY AUTOMATED COUNT: 41 FL (ref 35.9–50)
EST. AVERAGE GLUCOSE BLD GHB EST-MCNC: 111 MG/DL
FASTING STATUS PATIENT QL REPORTED: NORMAL
GFR SERPLBLD CREATININE-BSD FMLA CKD-EPI: 109 ML/MIN/1.73 M 2
GLOBULIN SER CALC-MCNC: 2.4 G/DL (ref 1.9–3.5)
GLUCOSE SERPL-MCNC: 103 MG/DL (ref 65–99)
HBA1C MFR BLD: 5.5 % (ref 4–5.6)
HCT VFR BLD AUTO: 46.2 % (ref 42–52)
HDLC SERPL-MCNC: 54 MG/DL
HGB BLD-MCNC: 16 G/DL (ref 14–18)
IMM GRANULOCYTES # BLD AUTO: 0.03 K/UL (ref 0–0.11)
IMM GRANULOCYTES NFR BLD AUTO: 0.5 % (ref 0–0.9)
LDLC SERPL CALC-MCNC: 117 MG/DL
LYMPHOCYTES # BLD AUTO: 1.58 K/UL (ref 1–4.8)
LYMPHOCYTES NFR BLD: 24.7 % (ref 22–41)
MCH RBC QN AUTO: 31.1 PG (ref 27–33)
MCHC RBC AUTO-ENTMCNC: 34.6 G/DL (ref 33.7–35.3)
MCV RBC AUTO: 89.7 FL (ref 81.4–97.8)
MONOCYTES # BLD AUTO: 0.46 K/UL (ref 0–0.85)
MONOCYTES NFR BLD AUTO: 7.2 % (ref 0–13.4)
NEUTROPHILS # BLD AUTO: 4.16 K/UL (ref 1.82–7.42)
NEUTROPHILS NFR BLD: 64.9 % (ref 44–72)
NRBC # BLD AUTO: 0 K/UL
NRBC BLD-RTO: 0 /100 WBC
PLATELET # BLD AUTO: 221 K/UL (ref 164–446)
PMV BLD AUTO: 10.7 FL (ref 9–12.9)
POTASSIUM SERPL-SCNC: 4.1 MMOL/L (ref 3.6–5.5)
PROT SERPL-MCNC: 7 G/DL (ref 6–8.2)
RBC # BLD AUTO: 5.15 M/UL (ref 4.7–6.1)
SODIUM SERPL-SCNC: 140 MMOL/L (ref 135–145)
TRIGL SERPL-MCNC: 96 MG/DL (ref 0–149)
WBC # BLD AUTO: 6.4 K/UL (ref 4.8–10.8)

## 2023-03-28 PROCEDURE — 80061 LIPID PANEL: CPT

## 2023-03-28 PROCEDURE — 80053 COMPREHEN METABOLIC PANEL: CPT

## 2023-03-28 PROCEDURE — 85025 COMPLETE CBC W/AUTO DIFF WBC: CPT

## 2023-03-28 PROCEDURE — 36415 COLL VENOUS BLD VENIPUNCTURE: CPT

## 2023-03-28 PROCEDURE — 83036 HEMOGLOBIN GLYCOSYLATED A1C: CPT

## 2023-06-01 ENCOUNTER — APPOINTMENT (OUTPATIENT)
Dept: RADIOLOGY | Facility: MEDICAL CENTER | Age: 48
End: 2023-06-01
Attending: NURSE PRACTITIONER
Payer: COMMERCIAL

## 2023-06-01 DIAGNOSIS — K42.0 UMBILICAL HERNIA WITH OBSTRUCTION: ICD-10-CM

## 2023-06-01 PROCEDURE — 76705 ECHO EXAM OF ABDOMEN: CPT

## 2023-08-21 ENCOUNTER — HOSPITAL ENCOUNTER (OUTPATIENT)
Dept: RADIOLOGY | Facility: MEDICAL CENTER | Age: 48
End: 2023-08-21
Attending: NURSE PRACTITIONER
Payer: COMMERCIAL

## 2023-08-21 DIAGNOSIS — R10.31 RIGHT INGUINAL PAIN: ICD-10-CM

## 2023-08-21 PROCEDURE — 76857 US EXAM PELVIC LIMITED: CPT

## 2023-09-26 ENCOUNTER — APPOINTMENT (OUTPATIENT)
Dept: RADIOLOGY | Facility: MEDICAL CENTER | Age: 48
End: 2023-09-26
Attending: SURGERY
Payer: COMMERCIAL

## 2023-09-26 DIAGNOSIS — R10.31 RIGHT LOWER QUADRANT PAIN: ICD-10-CM

## 2023-09-26 PROCEDURE — 76705 ECHO EXAM OF ABDOMEN: CPT

## 2024-01-02 ENCOUNTER — HOSPITAL ENCOUNTER (OUTPATIENT)
Dept: RADIOLOGY | Facility: MEDICAL CENTER | Age: 49
End: 2024-01-02
Attending: SURGERY
Payer: COMMERCIAL

## 2024-01-02 DIAGNOSIS — R10.2 PELVIC AND PERINEAL PAIN: ICD-10-CM

## 2024-01-02 PROCEDURE — 76857 US EXAM PELVIC LIMITED: CPT

## 2024-01-05 ENCOUNTER — HOSPITAL ENCOUNTER (OUTPATIENT)
Dept: LAB | Facility: MEDICAL CENTER | Age: 49
End: 2024-01-05
Attending: NURSE PRACTITIONER
Payer: COMMERCIAL

## 2024-01-05 LAB
ALBUMIN SERPL BCP-MCNC: 4.6 G/DL (ref 3.2–4.9)
ALBUMIN/GLOB SERPL: 1.6 G/DL
ALP SERPL-CCNC: 61 U/L (ref 30–99)
ALT SERPL-CCNC: 26 U/L (ref 2–50)
ANION GAP SERPL CALC-SCNC: 13 MMOL/L (ref 7–16)
AST SERPL-CCNC: 23 U/L (ref 12–45)
BILIRUB SERPL-MCNC: 0.9 MG/DL (ref 0.1–1.5)
BUN SERPL-MCNC: 22 MG/DL (ref 8–22)
CALCIUM ALBUM COR SERPL-MCNC: 9.1 MG/DL (ref 8.5–10.5)
CALCIUM SERPL-MCNC: 9.6 MG/DL (ref 8.5–10.5)
CHLORIDE SERPL-SCNC: 103 MMOL/L (ref 96–112)
CHOLEST SERPL-MCNC: 201 MG/DL (ref 100–199)
CO2 SERPL-SCNC: 26 MMOL/L (ref 20–33)
CREAT SERPL-MCNC: 0.81 MG/DL (ref 0.5–1.4)
EST. AVERAGE GLUCOSE BLD GHB EST-MCNC: 114 MG/DL
FASTING STATUS PATIENT QL REPORTED: NORMAL
GFR SERPLBLD CREATININE-BSD FMLA CKD-EPI: 108 ML/MIN/1.73 M 2
GLOBULIN SER CALC-MCNC: 2.8 G/DL (ref 1.9–3.5)
GLUCOSE SERPL-MCNC: 101 MG/DL (ref 65–99)
HBA1C MFR BLD: 5.6 % (ref 4–5.6)
HDLC SERPL-MCNC: 48 MG/DL
LDLC SERPL CALC-MCNC: 130 MG/DL
POTASSIUM SERPL-SCNC: 4.3 MMOL/L (ref 3.6–5.5)
PROT SERPL-MCNC: 7.4 G/DL (ref 6–8.2)
SODIUM SERPL-SCNC: 142 MMOL/L (ref 135–145)
TRIGL SERPL-MCNC: 117 MG/DL (ref 0–149)

## 2024-01-05 PROCEDURE — 80053 COMPREHEN METABOLIC PANEL: CPT

## 2024-01-05 PROCEDURE — 36415 COLL VENOUS BLD VENIPUNCTURE: CPT

## 2024-01-05 PROCEDURE — 80061 LIPID PANEL: CPT

## 2024-01-05 PROCEDURE — 83036 HEMOGLOBIN GLYCOSYLATED A1C: CPT

## 2024-07-18 ENCOUNTER — HOSPITAL ENCOUNTER (OUTPATIENT)
Dept: LAB | Facility: MEDICAL CENTER | Age: 49
End: 2024-07-18
Attending: NURSE PRACTITIONER
Payer: COMMERCIAL

## 2024-07-18 LAB
ALBUMIN SERPL BCP-MCNC: 4.4 G/DL (ref 3.2–4.9)
ALBUMIN/GLOB SERPL: 1.7 G/DL
ALP SERPL-CCNC: 60 U/L (ref 30–99)
ALT SERPL-CCNC: 33 U/L (ref 2–50)
ANION GAP SERPL CALC-SCNC: 14 MMOL/L (ref 7–16)
AST SERPL-CCNC: 30 U/L (ref 12–45)
BASOPHILS # BLD AUTO: 0.4 % (ref 0–1.8)
BASOPHILS # BLD: 0.02 K/UL (ref 0–0.12)
BILIRUB SERPL-MCNC: 1.2 MG/DL (ref 0.1–1.5)
BUN SERPL-MCNC: 19 MG/DL (ref 8–22)
CALCIUM ALBUM COR SERPL-MCNC: 8.9 MG/DL (ref 8.5–10.5)
CALCIUM SERPL-MCNC: 9.2 MG/DL (ref 8.5–10.5)
CHLORIDE SERPL-SCNC: 102 MMOL/L (ref 96–112)
CHOLEST SERPL-MCNC: 194 MG/DL (ref 100–199)
CO2 SERPL-SCNC: 24 MMOL/L (ref 20–33)
CREAT SERPL-MCNC: 0.82 MG/DL (ref 0.5–1.4)
EOSINOPHIL # BLD AUTO: 0.22 K/UL (ref 0–0.51)
EOSINOPHIL NFR BLD: 4 % (ref 0–6.9)
ERYTHROCYTE [DISTWIDTH] IN BLOOD BY AUTOMATED COUNT: 41.7 FL (ref 35.9–50)
EST. AVERAGE GLUCOSE BLD GHB EST-MCNC: 111 MG/DL
FASTING STATUS PATIENT QL REPORTED: NORMAL
GFR SERPLBLD CREATININE-BSD FMLA CKD-EPI: 107 ML/MIN/1.73 M 2
GLOBULIN SER CALC-MCNC: 2.6 G/DL (ref 1.9–3.5)
GLUCOSE SERPL-MCNC: 106 MG/DL (ref 65–99)
HBA1C MFR BLD: 5.5 % (ref 4–5.6)
HCT VFR BLD AUTO: 45.2 % (ref 42–52)
HDLC SERPL-MCNC: 48 MG/DL
HGB BLD-MCNC: 15.8 G/DL (ref 14–18)
IMM GRANULOCYTES # BLD AUTO: 0.02 K/UL (ref 0–0.11)
IMM GRANULOCYTES NFR BLD AUTO: 0.4 % (ref 0–0.9)
LDLC SERPL CALC-MCNC: 120 MG/DL
LYMPHOCYTES # BLD AUTO: 1.48 K/UL (ref 1–4.8)
LYMPHOCYTES NFR BLD: 27.2 % (ref 22–41)
MCH RBC QN AUTO: 31 PG (ref 27–33)
MCHC RBC AUTO-ENTMCNC: 35 G/DL (ref 32.3–36.5)
MCV RBC AUTO: 88.6 FL (ref 81.4–97.8)
MONOCYTES # BLD AUTO: 0.41 K/UL (ref 0–0.85)
MONOCYTES NFR BLD AUTO: 7.5 % (ref 0–13.4)
NEUTROPHILS # BLD AUTO: 3.3 K/UL (ref 1.82–7.42)
NEUTROPHILS NFR BLD: 60.5 % (ref 44–72)
NRBC # BLD AUTO: 0 K/UL
NRBC BLD-RTO: 0 /100 WBC (ref 0–0.2)
PLATELET # BLD AUTO: 200 K/UL (ref 164–446)
PMV BLD AUTO: 11.2 FL (ref 9–12.9)
POTASSIUM SERPL-SCNC: 4 MMOL/L (ref 3.6–5.5)
PROT SERPL-MCNC: 7 G/DL (ref 6–8.2)
RBC # BLD AUTO: 5.1 M/UL (ref 4.7–6.1)
SODIUM SERPL-SCNC: 140 MMOL/L (ref 135–145)
TRIGL SERPL-MCNC: 131 MG/DL (ref 0–149)
WBC # BLD AUTO: 5.5 K/UL (ref 4.8–10.8)

## 2024-07-18 PROCEDURE — 85025 COMPLETE CBC W/AUTO DIFF WBC: CPT

## 2024-07-18 PROCEDURE — 80061 LIPID PANEL: CPT

## 2024-07-18 PROCEDURE — 80053 COMPREHEN METABOLIC PANEL: CPT

## 2024-07-18 PROCEDURE — 36415 COLL VENOUS BLD VENIPUNCTURE: CPT

## 2024-07-18 PROCEDURE — 83036 HEMOGLOBIN GLYCOSYLATED A1C: CPT

## 2024-11-22 ENCOUNTER — HOSPITAL ENCOUNTER (OUTPATIENT)
Dept: LAB | Facility: MEDICAL CENTER | Age: 49
End: 2024-11-22
Attending: NURSE PRACTITIONER
Payer: COMMERCIAL

## 2024-11-22 LAB
ALBUMIN SERPL BCP-MCNC: 4.7 G/DL (ref 3.2–4.9)
ALBUMIN/GLOB SERPL: 1.6 G/DL
ALP SERPL-CCNC: 58 U/L (ref 30–99)
ALT SERPL-CCNC: 28 U/L (ref 2–50)
ANION GAP SERPL CALC-SCNC: 10 MMOL/L (ref 7–16)
AST SERPL-CCNC: 29 U/L (ref 12–45)
BASOPHILS # BLD AUTO: 0.6 % (ref 0–1.8)
BASOPHILS # BLD: 0.03 K/UL (ref 0–0.12)
BILIRUB SERPL-MCNC: 0.8 MG/DL (ref 0.1–1.5)
BUN SERPL-MCNC: 19 MG/DL (ref 8–22)
CALCIUM ALBUM COR SERPL-MCNC: 9 MG/DL (ref 8.5–10.5)
CALCIUM SERPL-MCNC: 9.6 MG/DL (ref 8.5–10.5)
CHLORIDE SERPL-SCNC: 102 MMOL/L (ref 96–112)
CHOLEST SERPL-MCNC: 202 MG/DL (ref 100–199)
CO2 SERPL-SCNC: 27 MMOL/L (ref 20–33)
CREAT SERPL-MCNC: 0.89 MG/DL (ref 0.5–1.4)
EOSINOPHIL # BLD AUTO: 0.32 K/UL (ref 0–0.51)
EOSINOPHIL NFR BLD: 6 % (ref 0–6.9)
ERYTHROCYTE [DISTWIDTH] IN BLOOD BY AUTOMATED COUNT: 41.5 FL (ref 35.9–50)
EST. AVERAGE GLUCOSE BLD GHB EST-MCNC: 114 MG/DL
FASTING STATUS PATIENT QL REPORTED: NORMAL
GFR SERPLBLD CREATININE-BSD FMLA CKD-EPI: 104 ML/MIN/1.73 M 2
GLOBULIN SER CALC-MCNC: 3 G/DL (ref 1.9–3.5)
GLUCOSE SERPL-MCNC: 107 MG/DL (ref 65–99)
HBA1C MFR BLD: 5.6 % (ref 4–5.6)
HCT VFR BLD AUTO: 45.9 % (ref 42–52)
HDLC SERPL-MCNC: 49 MG/DL
HGB BLD-MCNC: 15.8 G/DL (ref 14–18)
IMM GRANULOCYTES # BLD AUTO: 0.02 K/UL (ref 0–0.11)
IMM GRANULOCYTES NFR BLD AUTO: 0.4 % (ref 0–0.9)
LDLC SERPL CALC-MCNC: 129 MG/DL
LYMPHOCYTES # BLD AUTO: 1.32 K/UL (ref 1–4.8)
LYMPHOCYTES NFR BLD: 24.7 % (ref 22–41)
MCH RBC QN AUTO: 31 PG (ref 27–33)
MCHC RBC AUTO-ENTMCNC: 34.4 G/DL (ref 32.3–36.5)
MCV RBC AUTO: 90 FL (ref 81.4–97.8)
MONOCYTES # BLD AUTO: 0.37 K/UL (ref 0–0.85)
MONOCYTES NFR BLD AUTO: 6.9 % (ref 0–13.4)
NEUTROPHILS # BLD AUTO: 3.28 K/UL (ref 1.82–7.42)
NEUTROPHILS NFR BLD: 61.4 % (ref 44–72)
NRBC # BLD AUTO: 0 K/UL
NRBC BLD-RTO: 0 /100 WBC (ref 0–0.2)
PLATELET # BLD AUTO: 210 K/UL (ref 164–446)
PMV BLD AUTO: 11 FL (ref 9–12.9)
POTASSIUM SERPL-SCNC: 4.2 MMOL/L (ref 3.6–5.5)
PROT SERPL-MCNC: 7.7 G/DL (ref 6–8.2)
PSA SERPL DL<=0.01 NG/ML-MCNC: 0.67 NG/ML (ref 0–4)
RBC # BLD AUTO: 5.1 M/UL (ref 4.7–6.1)
SODIUM SERPL-SCNC: 139 MMOL/L (ref 135–145)
TRIGL SERPL-MCNC: 120 MG/DL (ref 0–149)
WBC # BLD AUTO: 5.3 K/UL (ref 4.8–10.8)

## 2024-11-22 PROCEDURE — 80053 COMPREHEN METABOLIC PANEL: CPT

## 2024-11-22 PROCEDURE — 83036 HEMOGLOBIN GLYCOSYLATED A1C: CPT

## 2024-11-22 PROCEDURE — 80061 LIPID PANEL: CPT

## 2024-11-22 PROCEDURE — 85025 COMPLETE CBC W/AUTO DIFF WBC: CPT

## 2024-11-22 PROCEDURE — 36415 COLL VENOUS BLD VENIPUNCTURE: CPT

## 2024-11-22 PROCEDURE — 84153 ASSAY OF PSA TOTAL: CPT

## 2025-05-09 ENCOUNTER — HOSPITAL ENCOUNTER (OUTPATIENT)
Dept: LAB | Facility: MEDICAL CENTER | Age: 50
End: 2025-05-09
Attending: NURSE PRACTITIONER
Payer: COMMERCIAL

## 2025-05-09 LAB
BASOPHILS # BLD AUTO: 0.5 % (ref 0–1.8)
BASOPHILS # BLD: 0.03 K/UL (ref 0–0.12)
EOSINOPHIL # BLD AUTO: 0.22 K/UL (ref 0–0.51)
EOSINOPHIL NFR BLD: 3.9 % (ref 0–6.9)
ERYTHROCYTE [DISTWIDTH] IN BLOOD BY AUTOMATED COUNT: 41.9 FL (ref 35.9–50)
HCT VFR BLD AUTO: 45.6 % (ref 42–52)
HGB BLD-MCNC: 15.9 G/DL (ref 14–18)
IMM GRANULOCYTES # BLD AUTO: 0.02 K/UL (ref 0–0.11)
IMM GRANULOCYTES NFR BLD AUTO: 0.4 % (ref 0–0.9)
LYMPHOCYTES # BLD AUTO: 1.61 K/UL (ref 1–4.8)
LYMPHOCYTES NFR BLD: 28.6 % (ref 22–41)
MCH RBC QN AUTO: 30.7 PG (ref 27–33)
MCHC RBC AUTO-ENTMCNC: 34.9 G/DL (ref 32.3–36.5)
MCV RBC AUTO: 88 FL (ref 81.4–97.8)
MONOCYTES # BLD AUTO: 0.44 K/UL (ref 0–0.85)
MONOCYTES NFR BLD AUTO: 7.8 % (ref 0–13.4)
NEUTROPHILS # BLD AUTO: 3.3 K/UL (ref 1.82–7.42)
NEUTROPHILS NFR BLD: 58.8 % (ref 44–72)
NRBC # BLD AUTO: 0 K/UL
NRBC BLD-RTO: 0 /100 WBC (ref 0–0.2)
PLATELET # BLD AUTO: 213 K/UL (ref 164–446)
PMV BLD AUTO: 10.7 FL (ref 9–12.9)
RBC # BLD AUTO: 5.18 M/UL (ref 4.7–6.1)
WBC # BLD AUTO: 5.6 K/UL (ref 4.8–10.8)

## 2025-05-09 PROCEDURE — 80053 COMPREHEN METABOLIC PANEL: CPT

## 2025-05-09 PROCEDURE — 85025 COMPLETE CBC W/AUTO DIFF WBC: CPT

## 2025-05-09 PROCEDURE — 83036 HEMOGLOBIN GLYCOSYLATED A1C: CPT

## 2025-05-09 PROCEDURE — 80061 LIPID PANEL: CPT

## 2025-05-09 PROCEDURE — 36415 COLL VENOUS BLD VENIPUNCTURE: CPT

## 2025-05-10 LAB
ALBUMIN SERPL BCP-MCNC: 4.4 G/DL (ref 3.2–4.9)
ALBUMIN/GLOB SERPL: 1.5 G/DL
ALP SERPL-CCNC: 57 U/L (ref 30–99)
ALT SERPL-CCNC: 34 U/L (ref 2–50)
ANION GAP SERPL CALC-SCNC: 9 MMOL/L (ref 7–16)
AST SERPL-CCNC: 31 U/L (ref 12–45)
BILIRUB SERPL-MCNC: 1.1 MG/DL (ref 0.1–1.5)
BUN SERPL-MCNC: 21 MG/DL (ref 8–22)
CALCIUM ALBUM COR SERPL-MCNC: 9.2 MG/DL (ref 8.5–10.5)
CALCIUM SERPL-MCNC: 9.5 MG/DL (ref 8.5–10.5)
CHLORIDE SERPL-SCNC: 105 MMOL/L (ref 96–112)
CHOLEST SERPL-MCNC: 187 MG/DL (ref 100–199)
CO2 SERPL-SCNC: 27 MMOL/L (ref 20–33)
CREAT SERPL-MCNC: 0.94 MG/DL (ref 0.5–1.4)
EST. AVERAGE GLUCOSE BLD GHB EST-MCNC: 117 MG/DL
FASTING STATUS PATIENT QL REPORTED: NORMAL
GFR SERPLBLD CREATININE-BSD FMLA CKD-EPI: 98 ML/MIN/1.73 M 2
GLOBULIN SER CALC-MCNC: 2.9 G/DL (ref 1.9–3.5)
GLUCOSE SERPL-MCNC: 103 MG/DL (ref 65–99)
HBA1C MFR BLD: 5.7 % (ref 4–5.6)
HDLC SERPL-MCNC: 45 MG/DL
LDLC SERPL CALC-MCNC: 123 MG/DL
POTASSIUM SERPL-SCNC: 4.1 MMOL/L (ref 3.6–5.5)
PROT SERPL-MCNC: 7.3 G/DL (ref 6–8.2)
SODIUM SERPL-SCNC: 141 MMOL/L (ref 135–145)
TRIGL SERPL-MCNC: 96 MG/DL (ref 0–149)

## 2025-05-30 ENCOUNTER — HOSPITAL ENCOUNTER (OUTPATIENT)
Dept: RADIOLOGY | Facility: MEDICAL CENTER | Age: 50
End: 2025-05-30
Attending: NURSE PRACTITIONER
Payer: COMMERCIAL

## 2025-05-30 DIAGNOSIS — Z09 STATUS POST UMBILICAL HERNIA REPAIR, FOLLOW-UP EXAM: ICD-10-CM

## 2025-05-30 DIAGNOSIS — R19.00 ABDOMINAL WALL BULGE: ICD-10-CM

## 2025-05-30 PROCEDURE — 76705 ECHO EXAM OF ABDOMEN: CPT
